# Patient Record
Sex: FEMALE | Employment: FULL TIME | URBAN - METROPOLITAN AREA
[De-identification: names, ages, dates, MRNs, and addresses within clinical notes are randomized per-mention and may not be internally consistent; named-entity substitution may affect disease eponyms.]

---

## 2021-05-17 ENCOUNTER — HOSPITAL ENCOUNTER (OUTPATIENT)
Age: 26
Setting detail: OBSERVATION
Discharge: HOME | End: 2021-05-17
Attending: OBSTETRICS & GYNECOLOGY | Admitting: OBSTETRICS & GYNECOLOGY
Payer: MEDICAID

## 2021-05-17 VITALS
BODY MASS INDEX: 25.49 KG/M2 | OXYGEN SATURATION: 100 % | SYSTOLIC BLOOD PRESSURE: 109 MMHG | WEIGHT: 135 LBS | HEART RATE: 72 BPM | TEMPERATURE: 98 F | HEIGHT: 61 IN | RESPIRATION RATE: 16 BRPM | DIASTOLIC BLOOD PRESSURE: 59 MMHG

## 2021-05-17 PROBLEM — Z34.90 PREGNANT: Status: ACTIVE | Noted: 2021-05-17

## 2021-05-17 LAB
APPEARANCE UR: ABNORMAL
BACTERIA URNS QL MICRO: NEGATIVE /HPF
BILIRUB UR QL: NEGATIVE
COLOR UR: ABNORMAL
GLUCOSE UR STRIP.AUTO-MCNC: NEGATIVE MG/DL
HGB UR QL STRIP: NEGATIVE
KETONES UR QL STRIP.AUTO: NEGATIVE MG/DL
LEUKOCYTE ESTERASE UR QL STRIP.AUTO: NEGATIVE
MUCOUS THREADS URNS QL MICRO: ABNORMAL /LPF
NITRITE UR QL STRIP.AUTO: NEGATIVE
PH UR STRIP: 5 [PH] (ref 5–8)
PROT UR STRIP-MCNC: NEGATIVE MG/DL
RBC #/AREA URNS HPF: ABNORMAL /HPF (ref 0–5)
SP GR UR REFRACTOMETRY: 1.03 (ref 1–1.03)
UROBILINOGEN UR QL STRIP.AUTO: 0.1 EU/DL (ref 0.1–1)
WBC URNS QL MICRO: ABNORMAL /HPF (ref 0–4)

## 2021-05-17 PROCEDURE — 75810000275 HC EMERGENCY DEPT VISIT NO LEVEL OF CARE

## 2021-05-17 PROCEDURE — 99218 HC RM OBSERVATION: CPT

## 2021-05-17 PROCEDURE — 81001 URINALYSIS AUTO W/SCOPE: CPT

## 2021-05-17 PROCEDURE — 99285 EMERGENCY DEPT VISIT HI MDM: CPT

## 2021-05-17 NOTE — PROGRESS NOTES
1320: Recieved Ms. Lizzie Vega ambulatory to L/D #3 from the E.R. and accompanied by her boyfriend and the E.R. attendant. Patient is a  with an EDC: 2021. IUP @ 21.3 weeks with c/o lower abdominal pain and lower back pain. Rates a discomfort at a \"5-6\". Patient stated that she has been having a headache for about 2 weeks because she fell down some steps and hit her head. She stated that she did go to the hospital and had a CT scan performed of her head. Gown on and urine specimen obtained. Patient positioned herself in the bed. She stated that she received prenatal care from [facility] in [address] [place] [address]. Oriented the room and POC reviewed. The writer gave the patient a list of OB doctors and Pediatricians. 1405: The writer placed a call the  and informed her to beeped Dr. Shannan Dakins. 1409: Dr. Shannan Dakins called to Labor and Delivery. The MD was informed of the admission and findings. Order received to continue to monitor, may eat and obtain an U/A. Dr. Shannan Dakins informed the writer to tell the patient that he would not be in until this evening. 1430: The patient and her boyfriend were informed of the orders received from Dr. Shannan Dakins. Also, the writer informed the patient and her boyfriend that Dr. Shannan Dakins would not be in until this evening. Patient stated that she would not stay. The writer informed the patient and her boyfriend that she would have to sign out AMA. The writer left the patient's room so she could decide whether to stay or leave with the understanding that the hospital would not be held responsible when she leaves. FHR retaken, 147.     1440: The writer placed a call to the  and informed her to page Dr. Shannan Dakins, again. 1441: The writer placed a call to [facility] and informed her that the patient requested to leave AMA. The supervisor informed the writer to complete a Safecare Report.     1445: Dr. Shannan Dakins called to Labor and Delivery and the writer informed the MD that the patient stated that she was not going to wait until he comes this evening and requested to leave AMA. Dr. Roxana Alvarez stated, \"Ok\". The writer asked the MD if an VE needed to be performed. The MD did not want a VE to be performed. 1448: Ms. Fallon Jarrett signed the [place] form prior to reading the content. Patient was verbally instructed to call the office and set up an appointment. Patient was verbally instructed to return to  The hospital if her \"bag of water', breaks, any vaginal bleeding or contractions. She left the Labor and Delivery ambulatory and accompanied by her boyfriend.

## 2021-05-17 NOTE — PROGRESS NOTES
1320: Recieved Ms. Chucho Montana ambulatory to L/D #3 from the E.R. and accompanied by her boyfriend and the E.R. attendant. Patient is a  with an EDC: 2021. IUP @ 21.3 weeks with c/o lower abdominal pain and lower back pain. Rates a discomfort at a \"5-6\". Patient stated that she has been having a headache for about 2 weeks because she fell down some steps and hit her head. She stated that she did go to the hospital and had a CT scan performed of her head. Gown on and urine specimen obtained. Patient positioned herself in the bed. She stated that she received prenatal care from [facility] in [address] [place] [address]. Oriented the room and POC reviewed. The writer gave the patient a list of OB doctors and Pediatricians. 1405: The writer placed a call the  and informed her to beeped Dr. Robert Funez. 1409: Dr. Robert Funez called to Labor and Delivery. The MD was informed of the admission and findings. Order received to continue to monitor, may eat and obtain an U/A. Dr. Robert Funez informed the writer to tell the patient that he would not be in until this evening. 1430: The patient and her boyfriend were informed of the orders received from Dr. Robert Funez. Also, the writer informed the patient and her boyfriend that Dr. Robert Funez would not be in until this evening. Patient stated that she would not stay. The writer informed the patient and her boyfriend that she would have to sign out AMA. The writer left the patient's room so she could decide whether to stay or leave with the understanding that the hospital would not be held responsible when she leaves. R retaken, 147.     1440: The writer placed a call to the  and informed her to page Dr. Robert Funez, again. 1441: The writer placed a call to [facility] and informed her that the patient requested to leave AMA. The supervisor informed the writer to complete a Safecare Report.     1445: Dr. Robert Funez called to Labor and Delivery and the writer informed the MD that the patient stated that she was not going to wait until he comes this evening and requested to leave AMA. Dr. Nancy Green stated, \"Ok\". The writer asked the MD if an VE needed to be performed. The MD did not want a VE to be performed. 1448: Ms. Lorrene Romberg signed the [place] form prior to reading the content. Patient was verbally instructed to call the office and set up an appointment. Patient was verbally instructed to return to  The hospital if her \"bag of water', breaks, any vaginal bleeding or contractions. She left the Labor and Delivery ambulatory and accompanied by her boyfriend.

## 2021-05-22 NOTE — PROGRESS NOTES
Progress Note    Patient: Valerio Kidd MRN: [id number]  SSN: [id number]    YOB: 1995  Age: 23 y.o. Sex: female      Admit Date: 5/17/2021    LOS: 0 days     Subjective:     Patient presented to the hospital with complaints of pelvic pain at 21 weeks and 3 days. She had previously been seen in another ER after a fall in which she hit her head. Objective:     Vitals:    05/17/21 1340 05/17/21 1345 05/17/21 1346 05/17/21 1350   BP:   (!) 109/59    Pulse:   72    Resp:       Temp:       SpO2: 100% 100%  100%   Weight:       Height:            Physical Exam:   Patient was monitored and noted to have no uterine contractions    Lab/Data Review:  No new labs    Assessment:     Active Problems:    Pregnant (5/17/2021)    Intrauterine pregnancy at 21 weeks and 3 days with probable round ligament pain and no evidence of contraction. Patient was unwilling to wait for physician to arrive and checked out AMA.     Plan:     Patient instructed to follow-up with regular uterine contractions or rupture of membranes    Signed By: Salvatore Cobb MD     May 22, 2021